# Patient Record
Sex: FEMALE | Race: NATIVE HAWAIIAN OR OTHER PACIFIC ISLANDER
[De-identification: names, ages, dates, MRNs, and addresses within clinical notes are randomized per-mention and may not be internally consistent; named-entity substitution may affect disease eponyms.]

---

## 2017-09-17 NOTE — C.PDOC
History Of Present Illness


Patient is a 25 y/o female who presents to the ED with complaints of lower 

pelvic and lower back discomfort for the last day. Patient has a Hx of ovarian 

cysts and reports that her symptoms feel similar; denies Hx of uterine 

fibroids. Admits to taking unknown dose of Motrin PTA. Patient is due for 

menstrual period in one week. No other physical complaints at this time. 


Time Seen by Provider: 09/17/17 13:52


Chief Complaint (Nursing): Abdominal Pain


History Per: Patient


History/Exam Limitations: no limitations


Onset/Duration Of Symptoms: Days (x1 dya. )


Current Symptoms Are (Timing): Still Present


Associated Symptoms: denies: Fever, Chills


Recent travel outside of the [place]: No





Past Medical History


Reviewed: Historical Data, Nursing Documentation, Vital Signs


Vital Signs: 


 Last Vital Signs











Temp  98.2 F   09/17/17 13:17


 


Pulse  64   09/17/17 16:24


 


Resp  16   09/17/17 16:24


 


BP  103/61   09/17/17 16:24


 


Pulse Ox  100   09/17/17 17:13














- Medical History


PMH: No Chronic Diseases


Surgical History: No Surg Hx


Family History: States: Unknown Family Hx





- Social History


Hx Alcohol Use: No


Hx Substance Use: No





- Immunization History


Hx Tetanus Toxoid Vaccination: No


Hx Influenza Vaccination: No


Hx Pneumococcal Vaccination: No





Review Of Systems


Constitutional: Negative for: Fever, Chills


Cardiovascular: Negative for: Chest Pain


Respiratory: Negative for: Shortness of Breath


Gastrointestinal: Positive for: Abdominal Pain (lower pelvic).  Negative for: 

Nausea, Vomiting


Musculoskeletal: Positive for: Back Pain (lower back)





Physical Exam





- Physical Exam


Appears: Well, Non-toxic, No Acute Distress


Skin: Normal Color, Warm, Dry


Head: Atraumatic, Normacephalic


Oral Mucosa: Moist


Chest: Symmetrical


Cardiovascular: Rhythm Regular, No Murmur


Respiratory: Normal Breath Sounds, No Rales, No Rhonchi, No Wheezing


Gastrointestinal/Abdominal: Tenderness (suprapubic and left adnexal area. )


Neurological/Psych: Oriented x3, Normal Speech, Normal Cognition





ED Course And Treatment





- Laboratory Results


Result Diagrams: 


 09/17/17 14:25





 09/17/17 14:25


Lab Interpretation: Normal (ua neg.)


Urine Pregnancy POC: Negative


O2 Sat by Pulse Oximetry: 100 (Room air)


Pulse Ox Interpretation: Normal


Progress Note: IVF/Toradol IV


Reevaluation Time: 17:10


Reassessment Condition: Improved





Medical Decision Making


Medical Decision Making: 


Plan: Toradol and IV fluids administered. 





prior h/o ovarian cysts now with mid-cycle L adnexal pain c/w hemorragic cyst


UTI/preg


pain controlled with Motrin/Toradol





small uterine fibroid incidental finding.





pt to f/u with her GYN





Disposition


Doctor Will See Patient In The: Office


Counseled Patient/Family Regarding: Studies Performed, Diagnosis





- Disposition


Referrals: 


Alonzo Calles MD [Staff Provider] - 


Disposition: HOME/ ROUTINE


Disposition Time: 17:12


Condition: GOOD


Additional Instructions: 


continue Motrin/Advil 600 mg every 6 hours as needed for pain


Continue Pepcid 20 mg @ night to prevent stomach irritation from the Motrin





Follow-up w your GYN





Urinalysis and pregnancy tests today negative.


Instructions:  Ovarian Cyst (ED)


Forms:  [facility] (English)





- Clinical Impression


Clinical Impression: 


 Ovarian cyst








- Scribe Statement


The provider has reviewed the documentation as recorded by the Scribe





Mayra Li








All medical record entries made by the Scribe were at my direction and 

personally dictated by me. I have reviewed the chart and agree that the record 

accurately reflects my personal performance of the history, physical exam, 

medical decision making, and the department course for this patient. I have 

also personally directed, reviewed, and agree with the discharge instructions 

and disposition.

## 2017-09-17 NOTE — US
HISTORY:

pelvic pain, h/o ovarian cysts



COMPARISON:

Comparison is made to the previous study dated 02/16/2016 previous CT 

dated 09/01/2016



TECHNIQUE:

Transabdominal and endovaginal ultrasound examination of the pelvis 

was performed.



FINDINGS:



UTERUS:

Measures 8.6 x 4.9 x 5.6 cm. Normal in size and appearance. There is 

fibroid at the posterior wall measures 1.4 x 0.9 x 1.2 centimeter.



ENDOMETRIUM:

Measures 30 mm in diameter. The endometrial stripe is heterogeneous 

contains small amount of fluid. 



CERVIX:

No cervical abnormality identified.



RIGHT OVARY:

Measures 4.3 x 3.1 x 3.97 cm. No solid mass. Normal flow. This 

complex cystic lesion at the right ovary measures 2.1 x 1.7 x 1.9 

centimeter.



LEFT OVARY:

Measures 6.1 x 3.9 x 4.8 cm. No solid mass. Normal flow. This complex 

cyst seen at the left ovary measures 4.1 x 2.7 x 3.3 centimeter. 

There is also adjacent slightly echogenic lesion measures 1.7 x 1.9 x 

1.8 centimeter.



FREE FLUID:

No significant free fluid noted.



OTHER FINDINGS:

None. 



IMPRESSION:

Complex cystic lesion at the left ovary measures 4.1 centimeter may 

represent hemorrhagic cyst. Adjacent slightly echogenic cyst also 

measures 1.9 centimeter likely represent hemorrhagic cyst.



2.1 centimeters cyst at the right ovary.



Six months follow-up reassessment is suggested.



Small posterior wall fibroid measures 1.4 centimeter.



Thick slightly heterogeneous endometrium measures 1.4 centimeter .

## 2017-11-03 ENCOUNTER — HOSPITAL ENCOUNTER (OUTPATIENT)
Dept: HOSPITAL 31 - C.SDS | Age: 27
Discharge: HOME | End: 2017-11-03
Attending: OBSTETRICS & GYNECOLOGY
Payer: COMMERCIAL

## 2017-11-03 VITALS
HEART RATE: 81 BPM | SYSTOLIC BLOOD PRESSURE: 107 MMHG | OXYGEN SATURATION: 100 % | TEMPERATURE: 97 F | DIASTOLIC BLOOD PRESSURE: 68 MMHG

## 2017-11-03 VITALS — RESPIRATION RATE: 18 BRPM

## 2017-11-03 DIAGNOSIS — N84.0: ICD-10-CM

## 2017-11-03 DIAGNOSIS — N94.9: ICD-10-CM

## 2017-11-03 DIAGNOSIS — N83.201: ICD-10-CM

## 2017-11-03 DIAGNOSIS — N92.0: Primary | ICD-10-CM

## 2017-11-03 DIAGNOSIS — N83.202: ICD-10-CM

## 2017-11-03 DIAGNOSIS — D25.9: ICD-10-CM

## 2017-11-03 PROCEDURE — 88305 TISSUE EXAM BY PATHOLOGIST: CPT

## 2017-11-03 PROCEDURE — 58340 CATHETER FOR HYSTEROGRAPHY: CPT

## 2017-11-03 PROCEDURE — 58559 HYSTEROSCOPY LYSIS: CPT

## 2017-11-03 PROCEDURE — 58350 REOPEN FALLOPIAN TUBE: CPT

## 2017-11-03 PROCEDURE — 58558 HYSTEROSCOPY BIOPSY: CPT

## 2017-11-03 PROCEDURE — 58662 LAPAROSCOPY EXCISE LESIONS: CPT

## 2017-11-03 NOTE — PCM.SURG1
Surgeon's Initial Post Op Note





- Surgeon's Notes


Surgeon: Dr. Wild


Assistant: Dr. Gautam


Type of Anesthesia: General Endo


Anesthesia Administered By: Dr. Pappas


Pre-Operative Diagnosis: 25 yo Go with Menorrhagia, Irregular mentrual cycle, 

Chronic Pelvic pain, Bilateral ovarian Cyst


Operative Findings: Endometrial polyp, Menorrhagia, Irregular mentrual cycle , 

Chronic pelvic pain, Endometrioma, Multiple adhesion , endometrisis, Fibroid 

uterus


Post-Operative Diagnosis: Menorrhagia, Endometrial polyp, Chronic pelvic pain, 

bilateral endometrioma , endometrosis, Multiple adhesion, Fibroid uterus , 

Bilateral Tubes are patent


Operation Performed: Hysteroscopy Myosure D and C, Laparoscopy, Lysis of 

Adhesion, Bilaterial Ovarian Cystectomy, Chemotubation


Specimen/Specimens Removed: EMC,ECC, Endometrial polyp, Bilateral ovarian cyst


Estimated Blood Loss: EBL {In ML}: 50


Blood Products Given: N/A


Drains Used: No Drains


Post-Op Condition: Good


Date of Surgery/Procedure: 11/03/17


Time of Surgery/Procedure: 12:45

## 2017-11-29 ENCOUNTER — HOSPITAL ENCOUNTER (EMERGENCY)
Dept: HOSPITAL 31 - C.ER | Age: 27
Discharge: HOME | End: 2017-11-29
Payer: COMMERCIAL

## 2017-11-29 VITALS
OXYGEN SATURATION: 100 % | HEART RATE: 71 BPM | TEMPERATURE: 98.1 F | SYSTOLIC BLOOD PRESSURE: 102 MMHG | DIASTOLIC BLOOD PRESSURE: 71 MMHG

## 2017-11-29 VITALS — BODY MASS INDEX: 25 KG/M2

## 2017-11-29 VITALS — RESPIRATION RATE: 16 BRPM

## 2017-11-29 DIAGNOSIS — E16.2: Primary | ICD-10-CM

## 2017-11-29 LAB
ALBUMIN/GLOB SERPL: 1.3 {RATIO} (ref 1–2.1)
ALP SERPL-CCNC: 52 U/L (ref 38–126)
ALT SERPL-CCNC: 31 U/L (ref 9–52)
AST SERPL-CCNC: 23 U/L (ref 14–36)
BACTERIA #/AREA URNS HPF: (no result) /[HPF]
BASOPHILS # BLD AUTO: 0.1 K/UL (ref 0–0.2)
BASOPHILS NFR BLD: 0.9 % (ref 0–2)
BILIRUB SERPL-MCNC: 0.8 MG/DL (ref 0.2–1.3)
BILIRUB UR-MCNC: NEGATIVE MG/DL
BUN SERPL-MCNC: 13 MG/DL (ref 7–17)
CALCIUM SERPL-MCNC: 8.1 MG/DL (ref 8.6–10.4)
CHLORIDE SERPL-SCNC: 101 MMOL/L (ref 98–107)
CO2 SERPL-SCNC: 23 MMOL/L (ref 22–30)
EOSINOPHIL # BLD AUTO: 0.6 K/UL (ref 0–0.7)
EOSINOPHIL NFR BLD: 6.2 % (ref 0–4)
ERYTHROCYTE [DISTWIDTH] IN BLOOD BY AUTOMATED COUNT: 12.9 % (ref 11.5–14.5)
GLOBULIN SER-MCNC: 3.1 GM/DL (ref 2.2–3.9)
GLUCOSE SERPL-MCNC: 59 MG/DL (ref 65–105)
GLUCOSE UR STRIP-MCNC: NORMAL MG/DL
HCT VFR BLD CALC: 36.8 % (ref 34–47)
KETONES UR STRIP-MCNC: (no result) MG/DL
LEUKOCYTE ESTERASE UR-ACNC: (no result) LEU/UL
LYMPHOCYTES # BLD AUTO: 1.6 K/UL (ref 1–4.3)
LYMPHOCYTES NFR BLD AUTO: 18.7 % (ref 20–40)
MCH RBC QN AUTO: 28 PG (ref 27–31)
MCHC RBC AUTO-ENTMCNC: 33.8 G/DL (ref 33–37)
MCV RBC AUTO: 82.6 FL (ref 81–99)
MONOCYTES # BLD: 0.5 K/UL (ref 0–0.8)
MONOCYTES NFR BLD: 5.6 % (ref 0–10)
NRBC BLD AUTO-RTO: 0 % (ref 0–2)
PH UR STRIP: 5 [PH] (ref 5–8)
PLATELET # BLD: 224 K/UL (ref 130–400)
PMV BLD AUTO: 8.6 FL (ref 7.2–11.7)
POTASSIUM SERPL-SCNC: 3.5 MMOL/L (ref 3.6–5.2)
PROT SERPL-MCNC: 7.1 G/DL (ref 6.3–8.3)
PROT UR STRIP-MCNC: NEGATIVE MG/DL
RBC # UR STRIP: NEGATIVE /UL
RBC #/AREA URNS HPF: 3 /HPF (ref 0–3)
SODIUM SERPL-SCNC: 132 MMOL/L (ref 132–148)
SP GR UR STRIP: 1.02 (ref 1–1.03)
TRANS CELLS #/AREA URNS HPF: < 1 /HPF (ref 0–3)
UROBILINOGEN UR-MCNC: NORMAL MG/DL (ref 0.2–1)
WBC # BLD AUTO: 8.8 K/UL (ref 4.8–10.8)
WBC #/AREA URNS HPF: 13 /HPF (ref 0–5)

## 2017-11-29 NOTE — C.PDOC
History Of Present Illness


28 y/o female presents to ED with complaints of an episode of sudden 

lightheadedness with generalized weakness and shaking lasting for 30 minutes 

this afternoon. Patient states she went to school nurse for symptoms and was 

told she had 130/100 blood pressure which is unusual for her. Patient was seen 

on 11/1 for abdominal pain and ruptured ovarian cyst, reports she followed up 

with OBGYN Dr. Wild who did a laproscopic surgery for removal of cyst and 

diagnosed with Endometriosis. Patient has been on Lupron and Progesterone since 

then and denies vaginal bleeding, vaginal discharge, abdominal pain, fever, 

chest pain or any other complaints at this time.  She did have an episode 

similar to  this last Saturday.


Time Seen by Provider: 11/29/17 15:28


Chief Complaint (Nursing): Weakness/Neurological Deficit


History Per: Patient


History/Exam Limitations: no limitations


Onset/Duration Of Symptoms: Days


Current Symptoms Are (Timing): Still Present





Past Medical History


Reviewed: Historical Data, Nursing Documentation, Vital Signs


Vital Signs: 


 Last Vital Signs











Temp  98.5 F   11/29/17 13:31


 


Pulse  86   11/29/17 13:31


 


Resp  16   11/29/17 13:31


 


BP  105/71   11/29/17 13:31


 


Pulse Ox  99   11/29/17 15:49














- Medical History


PMH: No Chronic Diseases


Surgical History: No Surg Hx


Family History: States: No Known Family Hx





- Social History


Hx Alcohol Use: No


Hx Substance Use: No





- Immunization History


Hx Tetanus Toxoid Vaccination: No


Hx Influenza Vaccination: No


Hx Pneumococcal Vaccination: No





Review Of Systems


Constitutional: Negative for: Fever, Chills


Cardiovascular: Positive for: Light Headedness.  Negative for: Chest Pain


Respiratory: Negative for: Shortness of Breath


Gastrointestinal: Negative for: Nausea, Vomiting


Neurological: Positive for: Weakness





Physical Exam





- Physical Exam


Appears: Non-toxic, No Acute Distress


Skin: Normal Color, Warm, Dry, No Rash


Head: Atraumatic, Normacephalic


Eye(s): bilateral: Normal Inspection, PERRL, EOMI


Oral Mucosa: Moist


Chest: Symmetrical


Cardiovascular: Rhythm Regular


Respiratory: Normal Breath Sounds, No Rales, No Rhonchi, No Wheezing


Gastrointestinal/Abdominal: Soft, No Tenderness, No Guarding, No Rebound


Extremity: Normal ROM, Capillary Refill (<2 seconds)


Neurological/Psych: Oriented x3, Normal Speech, Normal Cognition, Normal Motor, 

Normal Sensation


Gait: Steady





ED Course And Treatment





- Laboratory Results


Result Diagrams: 


 11/29/17 16:07





 11/29/17 16:07


Lab Interpretation: Abnormal


Interpretation Of Abnormal: Glucose 59, UA + WBC with 2+ leukocyte esterase and 

bacteria. Patient is currently asymptomatic. Culture sent.


O2 Sat by Pulse Oximetry: 99 (RA)


Pulse Ox Interpretation: Normal


Reevaluation Time: 16:50


Reassessment Condition: Improved





Disposition


Counseled Patient/Family Regarding: Studies Performed, Diagnosis, Need For 

Followup





- Disposition


Referrals: 


Tatyana Wild MD [Staff Provider] - 


Disposition: HOME/ ROUTINE


Disposition Time: 16:51


Condition: IMPROVED


Instructions:  Non-diabetic Hypoglycemia (ED)


Forms:  [facility] (English)





- Clinical Impression


Clinical Impression: 


 Hypoglycemia








- Scribe Statement


The provider has reviewed the documentation as recorded by the Amyibsondra Yee





All medical record entries made by the Scribe were at my direction and 

personally dictated by me. I have reviewed the chart and agree that the record 

accurately reflects my personal performance of the history, physical exam, 

medical decision making, and the department course for this patient. I have 

also personally directed, reviewed, and agree with the discharge instructions 

and disposition.

## 2017-12-19 NOTE — OP
PROCEDURE DATE:  2017



PREOPERATIVE DIAGNOSES:  A 26-year-old female  0 with menorrhagia,

irregular menstrual period, chronic pelvic pain, and bilateral ovarian

cysts.



PROCEDURE:  Hysteroscopy, dilation and curettage, MyoSure, laparotomy,

lysis of adhesions, bilateral ovarian cystectomy, and chromopertubation.



POSTOPERATIVE DIAGNOSES:  Menorrhagia, endometrial polyp, chronic pelvic

pain, bilateral endometriomas, endometriosis, multiple adhesions, fibroid

uterus, bilateral tubes were patent.



SURGEON:  Tatyana Wild MD.



ASSISTANT:  Ashely Gautam MD.



TYPE OF ANESTHESIA:  General.



ANESTHESIA ADMINISTERED BY:  Reymundo Lombardo MD.



FINDINGS:  An anteverted uterus of approximately 8 weeks' gestation, noted

to have multiple scar tissues due to the endometriosis, chocolate-colored

cysts bilaterally.  Through the hysteroscope, it was noted that we saw an

endometrial polyp.



COMPLICATIONS:  None.



ESTIMATED BLOOD LOSS:  Approximately 50 mL.



INPUT AND OUTPUT:  200 mL.



SPECIMEN:  EMC, ECC, polyp as well as a _____ ovarian cyst.



DESCRIPTION OF PROCEDURE:  The patient was informed of the risks factors,

benefits, and alternatives of the procedure.  Risk factors included

infection, bleeding, damage to the surrounding organs and tissues,

complication from anesthesia, and possible death.  All questions were

answered prior to taking to the operating room.  Once the consent form was

obtained, then she was taken to the operating room, prepped and draped in

normal sterile fashion, placed in dorsal lithotomy position.  In that

particular instance, a weighted speculum was placed into the vagina.  The

anterior lip of the cervix was grasped with a single-toothed tenaculum. 

The uterus was gently sounded to approximately 8 cm.  The hysteroscope was

then advanced and complete surveillance of the uterine cavity was then

performed.  In that particular instance, there was noted that the patient

had an endometrial polyp.  Due to that fact, the MyoSure device was then

activated and the endometrial polyp was removed under direct visualization.

In that particular instance, once the endometrial polyp was removed, the

MyoSure device was then removed and a hysteroscope was then placed and

complete surveillance of the uterus was again performed.  The hysteroscope

was then removed and a fractional D and C was performed.  EMC and ECC were

submitted to pathology.  The scope was then removed and the HUMI was then

placed in order to manipulate the uterus.  Once that was placed, a Germain

catheter was also placed and the weighted speculum, the single-toothed

tenaculum was then removed.  Attention was then placed to the abdominal

cavity, in which a 5 mm incision was made above the umbilicus and a 5 mm

optic view port under direct visualization was entering through the

abdominal cavity, 4 liters of CO2 gas was given.  At the same time, 2

trocars were placed, 5 mm in the right and in the left lower quadrants

under direct visualization.  Prior to doing that, the abdomen already had

been inflated with 4 liters of CO2 gas.  At this time, it was noted that

her uterus was anterior, the sigmoid colon was attached to the uterus, she

had multiple adhesions due to endometriosis, she had bilateral

chocolate-colored cysts.  At that particular point, approximately 5 to 6 cm

incision was made along the ovaries using the electrocautery.  It was then

dissected initially using the hydrodissection using blunt dissection, entry

into the cyst was created using the electrocautery.  Suction was then used

to drain the chocolate-colored cyst and the cystic wall was identified, was

removed using blunt dissection with up countertraction.  Once that was

performed, the ovarian bed was then irrigated and it was dry.  The same was

performed to the opposite side.  The EndoCatch bag was then placed in the 5

mm port and the cyst component was removed under direct visualization. 

Complete surveillance of the cavity was then performed.  Lysis of adhesion

was then performed.  She had multiple adhesions due to the endometriosis. 

It was also noted that she also had a leiomyoma in the uterus.  The uterus

was then lifted back and then chromopertubation was placed.  It was noted

that both tubes were working, they were both patent, which was positive. 

The abdomen was irrigated.  Excellent hemostasis was noted.  At this point,

the instruments were removed under visualization.  The 5 mm umbilical

incision was closed with 4-0 Monocryl.  The skin was closed using 4-0

Monocryl as well.  Steri-Strips were then placed after the closure. 

Excellent hemostasis was noted.  All instruments were removed and the

patient was cleaned.  Patient tolerated the procedure well without

complication, was taken to recovery in stable condition.





__________________________________________

Tatyana Wild MD



DD:  2017 9:59:48

DT:  2017 11:47:32

[facility] # [id number]

## 2019-03-16 ENCOUNTER — HOSPITAL ENCOUNTER (EMERGENCY)
Dept: HOSPITAL 31 - C.ER | Age: 29
Discharge: HOME | End: 2019-03-16
Payer: COMMERCIAL

## 2019-03-16 VITALS — SYSTOLIC BLOOD PRESSURE: 108 MMHG | HEART RATE: 72 BPM | DIASTOLIC BLOOD PRESSURE: 75 MMHG | RESPIRATION RATE: 16 BRPM

## 2019-03-16 VITALS — TEMPERATURE: 97.7 F

## 2019-03-16 VITALS — BODY MASS INDEX: 25.7 KG/M2

## 2019-03-16 VITALS — OXYGEN SATURATION: 99 %

## 2019-03-16 DIAGNOSIS — H57.89: ICD-10-CM

## 2019-03-16 DIAGNOSIS — H11.421: Primary | ICD-10-CM

## 2019-03-16 PROCEDURE — 99283 EMERGENCY DEPT VISIT LOW MDM: CPT

## 2019-03-16 NOTE — C.PDOC
History Of Present Illness





27 y/o female presents to ED complaining of swelling to her right eye after 

rubbing her dog today. She states it became immediately itchy, tearing, and 

slightly painful rating it 4/10. She notes she took benadryl 25 mg immediately. 

Admits to seasonal allergies and denies any fever, chills, visual disturbances, 

blurred vision, nausea, or vomiting. 





Time Seen by Provider: 03/16/19 12:49


Chief Complaint (Nursing): Eye Problem


History Per: Patient


History/Exam Limitations: no limitations


Onset/Duration Of Symptoms: Hrs


Current Symptoms Are (Timing): Still Present


Injury To Eye?: No


Severity: Mild


Quality: Pressure


Wears Contact Lens?: No


Associated Symptoms: Pain, Swelling, Itching


Recent travel outside of the [place]: No





Past Medical History


Reviewed: Historical Data, Nursing Documentation, Vital Signs


Vital Signs: 





                                Last Vital Signs











Temp  97.7 F   03/16/19 12:41


 


Pulse  63   03/16/19 12:41


 


Resp  18   03/16/19 12:41


 


BP  105/66   03/16/19 12:41


 


Pulse Ox  99   03/16/19 12:41











Family History: States: No Known Family Hx





- Social History


Hx Tobacco Use: No


Hx Alcohol Use: No


Hx Substance Use: No





- Immunization History


Hx Tetanus Toxoid Vaccination: No


Hx Influenza Vaccination: Yes


Hx Pneumococcal Vaccination: No





Review Of Systems


Constitutional: Negative for: Fever, Chills


Eyes: Positive for: Pain (mild pain of right eye), Conjunctivae Inflammation, 

Other (Right eye swelling and itching).  Negative for: Vision Change


ENT: Negative for: Nose Discharge, Throat Pain


Cardiovascular: Negative for: Chest Pain


Respiratory: Negative for: Shortness of Breath


Gastrointestinal: Negative for: Nausea, Vomiting


Skin: Negative for: Rash


Neurological: Negative for: Weakness, Numbness, Headache, Dizziness





Physical Exam





- Physical Exam


Appears: Non-toxic, No Acute Distress


Skin: Warm, Dry


Head: Atraumatic, Normacephalic


Eye(s): bilateral: PERRL, right: Other (chemosis from 0400 to 1100, slight 

injection with some tearing noted), left: Normal Inspection


Ear(s): Bilateral: Normal


Nose: Normal, No Discharge


Oral Mucosa: Moist


Tongue: Normal Appearing, No Swelling


Lips: Normal Appearing, No Swelling


Throat: Normal, No Erythema, No Exudate


Neck: Normal ROM, Supple


Chest: Symmetrical


Cardiovascular: Rhythm Regular, No Murmur


Respiratory: Normal Breath Sounds, No Wheezing


Gastrointestinal/Abdominal: Soft, No Tenderness


Neurological/Psych: Oriented x3, Normal Speech, Normal Cognition, Normal Motor, 

Normal Sensation


Gait: Steady





ED Course And Treatment


O2 Sat by Pulse Oximetry: 99 (RA)


Pulse Ox Interpretation: Normal





Medical Decision Making


Medical Decision Making: 





Plan:


--Benadryl 25 mg PO 


--Decadron 8 g PO 


--Pepcid 20 mg PO


-- Ice pack





Patient improved and is ready for discharge. 








Disposition


Counseled Patient/Family Regarding: Diagnosis, Need For Followup, Rx Given





- Disposition


Referrals: 


Adrian Renteria MD [Staff Provider] - 


Disposition: HOME/ ROUTINE


Disposition Time: 13:59


Condition: IMPROVED


Additional Instructions: 


Start Tobradex twice a day for 5 days


Start Prednisone 60mg once a day for 5 days


Continue Benadryl 25mg twice a day prn itching


Follow up with Ophtho in 1-2 days


Return to ED if symptoms worsen


Prescriptions: 


predniSONE [predniSONE Tab] 60 mg PO DAILY #15 tab


Tobramycin/Dexamethasone [Tobradex St Eye Drops] 1 drop OD BID #1 bottle


Instructions:  Conjunctivitis (Noninfectious Pinkeye) (DC)


Forms:  [facility] (English)





- Clinical Impression


Clinical Impression: 


 Allergic eye reaction, Chemosis of right conjunctiva








- PA / NP / Resident Statement


MD/DO has reviewed & agrees with the documentation as recorded.





- Scribe Statement


The provider has reviewed the documentation as recorded by the Scribe





Abbey Kimbrough





All medical record entries made by the Scribe were at my direction and person

ally dictated by me. I have reviewed the chart and agree that the record 

accurately reflects my personal performance of the history, physical exam, 

medical decision making, and the department course for this patient. I have also

personally directed, reviewed, and agree with the discharge instructions and 

disposition.